# Patient Record
Sex: FEMALE | Race: WHITE
[De-identification: names, ages, dates, MRNs, and addresses within clinical notes are randomized per-mention and may not be internally consistent; named-entity substitution may affect disease eponyms.]

---

## 2020-12-08 ENCOUNTER — HOSPITAL ENCOUNTER (OUTPATIENT)
Dept: HOSPITAL 53 - M LAB REF | Age: 70
End: 2020-12-08
Attending: INTERNAL MEDICINE
Payer: MEDICARE

## 2020-12-08 DIAGNOSIS — D64.9: Primary | ICD-10-CM

## 2020-12-08 LAB
FERRITIN SERPL-MCNC: 6 NG/ML (ref 8–252)
FOLATE SERPL-MCNC: > 24 NG/ML
IRON SATN MFR SERPL: 6 % (ref 13.2–45)
IRON SERPL-MCNC: 29 UG/DL (ref 50–170)
TIBC SERPL-MCNC: 482 UG/DL (ref 250–450)
VIT B12 SERPL-MCNC: 562 PG/ML

## 2020-12-18 ENCOUNTER — HOSPITAL ENCOUNTER (OUTPATIENT)
Dept: HOSPITAL 53 - M INFU | Age: 70
Discharge: HOME | End: 2020-12-18
Attending: INTERNAL MEDICINE
Payer: MEDICARE

## 2020-12-18 ENCOUNTER — HOSPITAL ENCOUNTER (OUTPATIENT)
Dept: HOSPITAL 53 - M RAD | Age: 70
End: 2020-12-18
Attending: INTERNAL MEDICINE
Payer: MEDICARE

## 2020-12-18 VITALS — HEIGHT: 66 IN | BODY MASS INDEX: 24.43 KG/M2 | WEIGHT: 152 LBS

## 2020-12-18 VITALS — SYSTOLIC BLOOD PRESSURE: 88 MMHG | DIASTOLIC BLOOD PRESSURE: 68 MMHG

## 2020-12-18 VITALS — DIASTOLIC BLOOD PRESSURE: 68 MMHG | SYSTOLIC BLOOD PRESSURE: 95 MMHG

## 2020-12-18 DIAGNOSIS — N18.32: Primary | ICD-10-CM

## 2020-12-18 DIAGNOSIS — Z88.2: ICD-10-CM

## 2020-12-18 DIAGNOSIS — R32: ICD-10-CM

## 2020-12-18 DIAGNOSIS — Z88.6: ICD-10-CM

## 2020-12-18 DIAGNOSIS — I12.9: ICD-10-CM

## 2020-12-18 DIAGNOSIS — N18.32: ICD-10-CM

## 2020-12-18 DIAGNOSIS — D50.9: Primary | ICD-10-CM

## 2020-12-18 DIAGNOSIS — Z88.0: ICD-10-CM

## 2020-12-18 DIAGNOSIS — Z88.1: ICD-10-CM

## 2020-12-18 PROCEDURE — 76857 US EXAM PELVIC LIMITED: CPT

## 2020-12-18 PROCEDURE — 76775 US EXAM ABDO BACK WALL LIM: CPT

## 2020-12-18 PROCEDURE — 96365 THER/PROPH/DIAG IV INF INIT: CPT

## 2020-12-18 NOTE — REP
INDICATION:

CKD STAGE 3B, BLADDER US WITH PVR.



COMPARISON:

None.



TECHNIQUE:

Bilateral renal ultrasound.



FINDINGS:

The right kidney measures 8.9 x 3.8 x 9.3 cm.

The left kidney measures 9.3 x 4.2 x 4.1 cm.

Both kidneys are in the  low normal size range.

Renal cortical echogenicity is normal bilaterally.

There is no calculus or hydronephrosis on the right or the left.

There are no solid or cystic renal masses on the right or the left.



IMPRESSION:

The kidneys are than low normal size range.

Otherwise, negative renal ultrasound.





<Electronically signed by Jose An > 12/18/20 [id number]

## 2020-12-18 NOTE — REP
INDICATION:

CKD STAGE 3B, BLADDER US WITH PVR.



COMPARISON:

None.



TECHNIQUE:

Bladder ultrasound.



FINDINGS:

The prevoid bladder volume is 328 mL.

The postvoid bladder volume is 30 mL.

The postvoid residual is 9%.



There is a nodule along the bladder wall inferiorly measuring 1-1.2 x 1.7 cm.  This is

nonspecific and could represent a neoplasm, polyp, focal inflammation or scarring.



IMPRESSION:

There is 9% bladder residual on the postvoid film.

There is a nonspecific nodule along the inferior bladder wall as described above.





<Electronically signed by Jose An > 12/18/20 [id number]

## 2020-12-30 ENCOUNTER — HOSPITAL ENCOUNTER (OUTPATIENT)
Dept: HOSPITAL 53 - M INFU | Age: 70
Discharge: HOME | End: 2020-12-30
Attending: INTERNAL MEDICINE
Payer: MEDICARE

## 2020-12-30 VITALS — SYSTOLIC BLOOD PRESSURE: 105 MMHG | DIASTOLIC BLOOD PRESSURE: 52 MMHG

## 2020-12-30 VITALS — SYSTOLIC BLOOD PRESSURE: 92 MMHG | DIASTOLIC BLOOD PRESSURE: 52 MMHG

## 2020-12-30 VITALS — WEIGHT: 149.91 LBS | BODY MASS INDEX: 31.47 KG/M2 | HEIGHT: 58 IN

## 2020-12-30 DIAGNOSIS — D50.9: Primary | ICD-10-CM

## 2020-12-30 DIAGNOSIS — Z79.899: ICD-10-CM

## 2020-12-30 PROCEDURE — 96365 THER/PROPH/DIAG IV INF INIT: CPT

## 2021-01-21 ENCOUNTER — HOSPITAL ENCOUNTER (OUTPATIENT)
Dept: HOSPITAL 53 - M SMT | Age: 71
End: 2021-01-21
Attending: NURSE PRACTITIONER
Payer: MEDICARE

## 2021-01-21 DIAGNOSIS — Z79.899: ICD-10-CM

## 2021-01-21 DIAGNOSIS — R93.41: Primary | ICD-10-CM

## 2021-01-21 LAB
APPEARANCE UR: CLEAR
BACTERIA UR QL AUTO: NEGATIVE
BILIRUB UR QL STRIP.AUTO: NEGATIVE
GLUCOSE UR QL STRIP.AUTO: NEGATIVE MG/DL
HGB UR QL STRIP.AUTO: NEGATIVE
KETONES UR QL STRIP.AUTO: NEGATIVE MG/DL
LEUKOCYTE ESTERASE UR QL STRIP.AUTO: (no result)
NITRITE UR QL STRIP.AUTO: NEGATIVE
PH UR STRIP.AUTO: 5 UNITS (ref 5–9)
PROT UR QL STRIP.AUTO: NEGATIVE MG/DL
RBC # UR AUTO: 0 /HPF (ref 0–3)
SP GR UR STRIP.AUTO: 1.01 (ref 1–1.03)
SQUAMOUS #/AREA URNS AUTO: 0 /HPF (ref 0–6)
UROBILINOGEN UR QL STRIP.AUTO: 0.2 MG/DL (ref 0–2)
WBC #/AREA URNS AUTO: 6 /HPF (ref 0–3)

## 2021-01-21 PROCEDURE — 81001 URINALYSIS AUTO W/SCOPE: CPT

## 2021-01-21 PROCEDURE — 87086 URINE CULTURE/COLONY COUNT: CPT

## 2021-03-03 ENCOUNTER — HOSPITAL ENCOUNTER (OUTPATIENT)
Dept: HOSPITAL 53 - M RAD | Age: 71
End: 2021-03-03
Attending: UROLOGY
Payer: MEDICARE

## 2021-03-03 DIAGNOSIS — N21.0: Primary | ICD-10-CM

## 2021-03-03 DIAGNOSIS — Z90.49: ICD-10-CM

## 2021-03-03 DIAGNOSIS — K57.30: ICD-10-CM

## 2021-03-03 DIAGNOSIS — K44.9: ICD-10-CM

## 2021-03-03 DIAGNOSIS — N32.9: ICD-10-CM

## 2021-03-03 PROCEDURE — 74178 CT ABD&PLV WO CNTR FLWD CNTR: CPT

## 2021-03-03 NOTE — REP
INDICATION:

LESION ON BLADDER.



COMPARISON:

None



TECHNIQUE:

Axial precontrast, contrast-enhanced and delayed images from the lung bases to the

pubic symphysis using 100 cc Isovue 370 intravenous contrast material.  Coronal and

sagittal reformations obtained along with CT urogram.



This CT examination was performed using the following dose reduction techniques:

Automated exposure control, adjustment of mA and/or kv according to the patient's

size, and the use of iterative reconstruction technique.



FINDINGS:

1 cm dependent calcification in the bladder consistent with bladder stone.  Delayed

images suggest very mild thickening at the right bladder trigone of uncertain clinical

significance.  The bilateral kidneys and ureters are normal in all phases of

evaluation.



Liver, spleen, pancreas, and bilateral adrenal glands are normal.  Evidence for prior

cholecystectomy with compensatory biliary ductal dilatation noted.



Moderate to large paraesophageal gastric hiatal hernia noted.  There is no evidence

for bowel obstruction or acute inflammatory process.  Sigmoid diverticulosis noted

without acute diverticulitis.



Further evaluation of the pelvis demonstrates prior hysterectomy.  Bladder as

described above.  Abdominal aorta and vasculature appear normal and without aneurysm

or dissection.  No ascites.  No free air.  No adenopathy.  Musculoskeletal structures

demonstrate epidural stimulator at the thoracic level along with evidence for prior

lumbar laminectomy.



IMPRESSION:

1. Bladder demonstrates 1 cm bladder stone and very subtle nonspecific prominence

along the right bladder trigone.  The bilateral kidneys/ureters are normal.

2. Moderate to large paraesophageal gastric hiatal hernia.

3. Diverticulosis without acute diverticulitis.

4. Evidence for prior cholecystectomy, hysterectomy, and laminectomy.







<Electronically signed by Rogers Cueva > 03/03/21 [id number]

## 2021-07-15 ENCOUNTER — HOSPITAL ENCOUNTER (OUTPATIENT)
Dept: HOSPITAL 53 - M SMT | Age: 71
End: 2021-07-15
Attending: NURSE PRACTITIONER
Payer: MEDICARE

## 2021-07-15 DIAGNOSIS — N21.0: Primary | ICD-10-CM

## 2021-07-15 LAB
APPEARANCE UR: CLEAR
BACTERIA UR QL AUTO: NEGATIVE
BILIRUB UR QL STRIP.AUTO: NEGATIVE
GLUCOSE UR QL STRIP.AUTO: NEGATIVE MG/DL
HGB UR QL STRIP.AUTO: NEGATIVE
KETONES UR QL STRIP.AUTO: NEGATIVE MG/DL
LEUKOCYTE ESTERASE UR QL STRIP.AUTO: (no result)
MUCOUS THREADS URNS QL MICRO: (no result)
NITRITE UR QL STRIP.AUTO: NEGATIVE
PH UR STRIP.AUTO: 6 UNITS (ref 5–9)
PROT UR QL STRIP.AUTO: NEGATIVE MG/DL
RBC # UR AUTO: 0 /HPF (ref 0–3)
SP GR UR STRIP.AUTO: 1.01 (ref 1–1.03)
SQUAMOUS #/AREA URNS AUTO: 1 /HPF (ref 0–6)
TRANS CELLS #/AREA URNS HPF: <1 /HPF
UROBILINOGEN UR QL STRIP.AUTO: 2 MG/DL (ref 0–2)
WBC #/AREA URNS AUTO: 20 /HPF (ref 0–3)

## 2021-07-15 PROCEDURE — 87086 URINE CULTURE/COLONY COUNT: CPT

## 2021-07-15 PROCEDURE — 81001 URINALYSIS AUTO W/SCOPE: CPT

## 2021-08-16 ENCOUNTER — HOSPITAL ENCOUNTER (OUTPATIENT)
Dept: HOSPITAL 53 - M LAB REF | Age: 71
End: 2021-08-16
Attending: INTERNAL MEDICINE
Payer: MEDICARE

## 2021-08-16 DIAGNOSIS — N21.0: Primary | ICD-10-CM

## 2021-08-19 ENCOUNTER — HOSPITAL ENCOUNTER (OUTPATIENT)
Dept: HOSPITAL 53 - M RAD | Age: 71
End: 2021-08-19
Payer: MEDICARE

## 2021-08-19 DIAGNOSIS — N20.0: Primary | ICD-10-CM

## 2021-08-19 NOTE — REP
INDICATION:

CALCULUS IN BLADDER.



COMPARISON:

CT 03/03/2021.



TECHNIQUE:

AP view abdomen and pelvis.



FINDINGS:

There is mild diffuse fecal material throughout the colon.  There is no bowel

obstruction, with no significantly dilated bowel loops identified.  There is a

heterogeneous calcific density overlying the bladder 1.2 cm in diameter.  Metallic

wires are visualized overlying the right side of the abdomen, the inferior aspect of

these wires overlies the right iliac bone.  The superior aspect crosses the right

upper quadrant to the midline, and the cephalic tip overlies the T10 vertebral body in

the midline.  There is evidence of prior laminectomy at L3 through L5.



IMPRESSION:

Heterogeneous 1.2 cm calcification overlies the bladder and may represent a calculus.





<Electronically signed by Jose Childers > 08/19/21 [id number]

## 2021-09-08 ENCOUNTER — HOSPITAL ENCOUNTER (OUTPATIENT)
Dept: HOSPITAL 53 - M INFU | Age: 71
Discharge: HOME | End: 2021-09-08
Attending: INTERNAL MEDICINE
Payer: MEDICARE

## 2021-09-08 VITALS — DIASTOLIC BLOOD PRESSURE: 53 MMHG | SYSTOLIC BLOOD PRESSURE: 98 MMHG

## 2021-09-08 VITALS — SYSTOLIC BLOOD PRESSURE: 96 MMHG | DIASTOLIC BLOOD PRESSURE: 51 MMHG

## 2021-09-08 VITALS — WEIGHT: 162.99 LBS | HEIGHT: 66 IN | BODY MASS INDEX: 26.19 KG/M2

## 2021-09-08 DIAGNOSIS — Z88.6: ICD-10-CM

## 2021-09-08 DIAGNOSIS — Z88.1: ICD-10-CM

## 2021-09-08 DIAGNOSIS — Z88.2: ICD-10-CM

## 2021-09-08 DIAGNOSIS — D50.9: Primary | ICD-10-CM

## 2021-09-08 DIAGNOSIS — Z88.8: ICD-10-CM

## 2021-09-08 PROCEDURE — 96365 THER/PROPH/DIAG IV INF INIT: CPT

## 2021-09-15 ENCOUNTER — HOSPITAL ENCOUNTER (OUTPATIENT)
Dept: HOSPITAL 53 - M INFU | Age: 71
Discharge: HOME | End: 2021-09-15
Attending: INTERNAL MEDICINE
Payer: MEDICARE

## 2021-09-15 VITALS — DIASTOLIC BLOOD PRESSURE: 66 MMHG | SYSTOLIC BLOOD PRESSURE: 110 MMHG

## 2021-09-15 VITALS — HEIGHT: 56 IN | BODY MASS INDEX: 36.65 KG/M2 | WEIGHT: 162.92 LBS

## 2021-09-15 VITALS — DIASTOLIC BLOOD PRESSURE: 53 MMHG | SYSTOLIC BLOOD PRESSURE: 101 MMHG

## 2021-09-15 VITALS — SYSTOLIC BLOOD PRESSURE: 124 MMHG | DIASTOLIC BLOOD PRESSURE: 60 MMHG

## 2021-09-15 VITALS — SYSTOLIC BLOOD PRESSURE: 102 MMHG | DIASTOLIC BLOOD PRESSURE: 52 MMHG

## 2021-09-15 DIAGNOSIS — Z88.1: ICD-10-CM

## 2021-09-15 DIAGNOSIS — D50.9: Primary | ICD-10-CM

## 2021-09-15 DIAGNOSIS — Z88.8: ICD-10-CM

## 2021-09-15 DIAGNOSIS — Z88.0: ICD-10-CM

## 2021-09-15 DIAGNOSIS — Z88.6: ICD-10-CM

## 2021-09-15 PROCEDURE — 96366 THER/PROPH/DIAG IV INF ADDON: CPT

## 2021-09-15 PROCEDURE — 96365 THER/PROPH/DIAG IV INF INIT: CPT

## 2022-03-17 ENCOUNTER — HOSPITAL ENCOUNTER (OUTPATIENT)
Dept: HOSPITAL 53 - M INFU | Age: 72
Discharge: HOME | End: 2022-03-17
Attending: INTERNAL MEDICINE
Payer: MEDICARE

## 2022-03-17 VITALS — HEIGHT: 56 IN | BODY MASS INDEX: 38.68 KG/M2 | WEIGHT: 171.96 LBS

## 2022-03-17 VITALS — DIASTOLIC BLOOD PRESSURE: 54 MMHG | SYSTOLIC BLOOD PRESSURE: 109 MMHG

## 2022-03-17 VITALS — DIASTOLIC BLOOD PRESSURE: 60 MMHG | SYSTOLIC BLOOD PRESSURE: 125 MMHG

## 2022-03-17 DIAGNOSIS — Z88.1: ICD-10-CM

## 2022-03-17 DIAGNOSIS — D50.9: Primary | ICD-10-CM

## 2022-03-17 DIAGNOSIS — Z88.2: ICD-10-CM

## 2022-03-17 DIAGNOSIS — Z88.8: ICD-10-CM

## 2022-03-17 PROCEDURE — 96365 THER/PROPH/DIAG IV INF INIT: CPT

## 2022-03-22 ENCOUNTER — HOSPITAL ENCOUNTER (OUTPATIENT)
Dept: HOSPITAL 53 - M RAD | Age: 72
End: 2022-03-22
Attending: INTERNAL MEDICINE
Payer: MEDICARE

## 2022-03-22 DIAGNOSIS — N18.32: Primary | ICD-10-CM

## 2022-03-22 DIAGNOSIS — N21.0: ICD-10-CM

## 2022-03-22 PROCEDURE — 74177 CT ABD & PELVIS W/CONTRAST: CPT

## 2022-03-24 ENCOUNTER — HOSPITAL ENCOUNTER (OUTPATIENT)
Dept: HOSPITAL 53 - M INFU | Age: 72
Discharge: HOME | End: 2022-03-24
Attending: INTERNAL MEDICINE
Payer: MEDICARE

## 2022-03-24 VITALS — SYSTOLIC BLOOD PRESSURE: 126 MMHG | DIASTOLIC BLOOD PRESSURE: 65 MMHG

## 2022-03-24 VITALS — SYSTOLIC BLOOD PRESSURE: 111 MMHG | DIASTOLIC BLOOD PRESSURE: 73 MMHG

## 2022-03-24 VITALS — SYSTOLIC BLOOD PRESSURE: 137 MMHG | DIASTOLIC BLOOD PRESSURE: 71 MMHG

## 2022-03-24 VITALS — HEIGHT: 56 IN | BODY MASS INDEX: 38.54 KG/M2 | WEIGHT: 171.34 LBS

## 2022-03-24 DIAGNOSIS — Z88.1: ICD-10-CM

## 2022-03-24 DIAGNOSIS — Z88.2: ICD-10-CM

## 2022-03-24 DIAGNOSIS — Z88.8: ICD-10-CM

## 2022-03-24 DIAGNOSIS — D50.9: Primary | ICD-10-CM

## 2022-03-24 PROCEDURE — 96365 THER/PROPH/DIAG IV INF INIT: CPT

## 2022-03-24 PROCEDURE — 96366 THER/PROPH/DIAG IV INF ADDON: CPT

## 2022-11-29 ENCOUNTER — HOSPITAL ENCOUNTER (OUTPATIENT)
Dept: HOSPITAL 53 - M RAD | Age: 72
End: 2022-11-29
Attending: PHYSICIAN ASSISTANT
Payer: MEDICARE

## 2022-11-29 DIAGNOSIS — L97.522: Primary | ICD-10-CM

## 2022-12-12 ENCOUNTER — HOSPITAL ENCOUNTER (INPATIENT)
Dept: HOSPITAL 53 - M ED | Age: 72
LOS: 3 days | Discharge: HOME | DRG: 812 | End: 2022-12-15
Attending: INTERNAL MEDICINE | Admitting: INTERNAL MEDICINE
Payer: MEDICARE

## 2022-12-12 VITALS — HEIGHT: 55 IN | BODY MASS INDEX: 37.14 KG/M2 | WEIGHT: 160.5 LBS

## 2022-12-12 DIAGNOSIS — F41.9: ICD-10-CM

## 2022-12-12 DIAGNOSIS — K57.30: ICD-10-CM

## 2022-12-12 DIAGNOSIS — Z88.8: ICD-10-CM

## 2022-12-12 DIAGNOSIS — D50.9: Primary | ICD-10-CM

## 2022-12-12 DIAGNOSIS — E03.9: ICD-10-CM

## 2022-12-12 DIAGNOSIS — K64.8: ICD-10-CM

## 2022-12-12 DIAGNOSIS — S91.302S: ICD-10-CM

## 2022-12-12 DIAGNOSIS — Z79.890: ICD-10-CM

## 2022-12-12 DIAGNOSIS — N18.30: ICD-10-CM

## 2022-12-12 DIAGNOSIS — Z79.899: ICD-10-CM

## 2022-12-12 DIAGNOSIS — Z88.0: ICD-10-CM

## 2022-12-12 DIAGNOSIS — Z79.82: ICD-10-CM

## 2022-12-12 DIAGNOSIS — D62: ICD-10-CM

## 2022-12-12 DIAGNOSIS — Z88.1: ICD-10-CM

## 2022-12-12 DIAGNOSIS — K29.70: ICD-10-CM

## 2022-12-12 DIAGNOSIS — K44.9: ICD-10-CM

## 2022-12-12 DIAGNOSIS — Z88.6: ICD-10-CM

## 2022-12-12 DIAGNOSIS — F32.A: ICD-10-CM

## 2022-12-12 DIAGNOSIS — J45.909: ICD-10-CM

## 2022-12-12 DIAGNOSIS — Z96.653: ICD-10-CM

## 2022-12-12 DIAGNOSIS — E78.5: ICD-10-CM

## 2022-12-12 DIAGNOSIS — L93.0: ICD-10-CM

## 2022-12-12 DIAGNOSIS — G60.8: ICD-10-CM

## 2022-12-12 DIAGNOSIS — I12.9: ICD-10-CM

## 2022-12-12 DIAGNOSIS — Z88.2: ICD-10-CM

## 2022-12-12 LAB
BASOPHILS # BLD AUTO: 0 10^3/UL (ref 0–0.2)
BASOPHILS NFR BLD AUTO: 0.5 % (ref 0–1)
EOSINOPHIL # BLD AUTO: 0.1 10^3/UL (ref 0–0.5)
EOSINOPHIL NFR BLD AUTO: 1.3 % (ref 0–3)
HCT VFR BLD AUTO: 19.6 % (ref 36–47)
HGB BLD-MCNC: 4.9 G/DL (ref 12–15.5)
LYMPHOCYTES # BLD AUTO: 1.7 10^3/UL (ref 1.5–5)
LYMPHOCYTES NFR BLD AUTO: 26.4 % (ref 24–44)
MCH RBC QN AUTO: 17 PG (ref 27–33)
MCHC RBC AUTO-ENTMCNC: 25 G/DL (ref 32–36.5)
MCV RBC AUTO: 68.1 FL (ref 80–96)
MONOCYTES # BLD AUTO: 0.5 10^3/UL (ref 0–0.8)
MONOCYTES NFR BLD AUTO: 8.3 % (ref 2–8)
NEUTROPHILS # BLD AUTO: 3.9 10^3/UL (ref 1.5–8.5)
NEUTROPHILS NFR BLD AUTO: 62.7 % (ref 36–66)
PLATELET # BLD AUTO: 233 10^3/UL (ref 150–450)
RBC # BLD AUTO: 2.88 10^6/UL (ref 4–5.4)
WBC # BLD AUTO: 6.3 10^3/UL (ref 4–10)

## 2022-12-13 VITALS — DIASTOLIC BLOOD PRESSURE: 66 MMHG | SYSTOLIC BLOOD PRESSURE: 142 MMHG

## 2022-12-13 VITALS — DIASTOLIC BLOOD PRESSURE: 60 MMHG | SYSTOLIC BLOOD PRESSURE: 127 MMHG

## 2022-12-13 VITALS — SYSTOLIC BLOOD PRESSURE: 122 MMHG | DIASTOLIC BLOOD PRESSURE: 60 MMHG

## 2022-12-13 VITALS — SYSTOLIC BLOOD PRESSURE: 117 MMHG | DIASTOLIC BLOOD PRESSURE: 58 MMHG

## 2022-12-13 VITALS — SYSTOLIC BLOOD PRESSURE: 140 MMHG | DIASTOLIC BLOOD PRESSURE: 68 MMHG

## 2022-12-13 VITALS — SYSTOLIC BLOOD PRESSURE: 113 MMHG | DIASTOLIC BLOOD PRESSURE: 56 MMHG

## 2022-12-13 VITALS — DIASTOLIC BLOOD PRESSURE: 60 MMHG | SYSTOLIC BLOOD PRESSURE: 125 MMHG

## 2022-12-13 VITALS — SYSTOLIC BLOOD PRESSURE: 105 MMHG | DIASTOLIC BLOOD PRESSURE: 55 MMHG

## 2022-12-13 VITALS — DIASTOLIC BLOOD PRESSURE: 57 MMHG | SYSTOLIC BLOOD PRESSURE: 126 MMHG

## 2022-12-13 LAB
APTT BLD: 25.2 SECONDS (ref 24.8–34.2)
BUN SERPL-MCNC: 15 MG/DL (ref 9–23)
BUN SERPL-MCNC: 16 MG/DL (ref 9–23)
CALCIUM SERPL-MCNC: 8.4 MG/DL (ref 8.3–10.6)
CALCIUM SERPL-MCNC: 8.5 MG/DL (ref 8.3–10.6)
CHLORIDE SERPL-SCNC: 105 MMOL/L (ref 98–107)
CHLORIDE SERPL-SCNC: 109 MMOL/L (ref 98–107)
CK MB CFR.DF SERPL CALC: 1.66
CK MB CFR.DF SERPL CALC: 2.17
CK MB SERPL-MCNC: < 1 NG/ML (ref ?–3.6)
CK MB SERPL-MCNC: < 1 NG/ML (ref ?–3.6)
CK SERPL-CCNC: 46 U/L (ref 34–145)
CK SERPL-CCNC: 60 U/L (ref 34–145)
CO2 SERPL-SCNC: 27 MMOL/L (ref 20–31)
CO2 SERPL-SCNC: 29 MMOL/L (ref 20–31)
CREAT SERPL-MCNC: 0.99 MG/DL (ref 0.55–1.3)
CREAT SERPL-MCNC: 1 MG/DL (ref 0.55–1.3)
FERRITIN SERPL-MCNC: 2.9 NG/ML (ref 7.3–270.7)
GFR SERPL CREATININE-BSD FRML MDRD: 58.4 ML/MIN/{1.73_M2} (ref 39–?)
GFR SERPL CREATININE-BSD FRML MDRD: 59 ML/MIN/{1.73_M2} (ref 39–?)
GLUCOSE SERPL-MCNC: 109 MG/DL (ref 74–106)
GLUCOSE SERPL-MCNC: 77 MG/DL (ref 74–106)
HCT VFR BLD AUTO: 34 % (ref 36–47)
HGB BLD-MCNC: 10.2 G/DL (ref 12–15.5)
INR PPP: 0.97
IRON SATN MFR SERPL: 2 % (ref 13.2–45)
IRON SERPL-MCNC: 9 UG/DL (ref 50–170)
POTASSIUM SERPL-SCNC: 3.8 MMOL/L (ref 3.5–5.1)
POTASSIUM SERPL-SCNC: 4.5 MMOL/L (ref 3.5–5.1)
PROTHROMBIN TIME: 13.1 SECONDS (ref 12.5–14.5)
SODIUM SERPL-SCNC: 142 MMOL/L (ref 136–145)
SODIUM SERPL-SCNC: 142 MMOL/L (ref 136–145)
TIBC SERPL-MCNC: 449 UG/DL (ref 250–425)

## 2022-12-13 PROCEDURE — 30233N1 TRANSFUSION OF NONAUTOLOGOUS RED BLOOD CELLS INTO PERIPHERAL VEIN, PERCUTANEOUS APPROACH: ICD-10-PCS | Performed by: EMERGENCY MEDICINE

## 2022-12-13 RX ADMIN — PREGABALIN SCH MG: 75 CAPSULE ORAL at 16:56

## 2022-12-13 RX ADMIN — FERROUS SULFATE TAB 325 MG (65 MG ELEMENTAL FE) SCH MG: 325 (65 FE) TAB at 20:38

## 2022-12-13 RX ADMIN — DOCUSATE SODIUM SCH MG: 100 CAPSULE, LIQUID FILLED ORAL at 09:01

## 2022-12-13 RX ADMIN — PREGABALIN SCH MG: 75 CAPSULE ORAL at 13:23

## 2022-12-13 RX ADMIN — FEXOFENADINE HYDROCHLORIDE SCH MG: 60 TABLET ORAL at 20:37

## 2022-12-13 RX ADMIN — TORSEMIDE SCH MG: 10 TABLET ORAL at 08:07

## 2022-12-13 RX ADMIN — METHADONE HYDROCHLORIDE SCH MG: 10 TABLET ORAL at 03:58

## 2022-12-13 RX ADMIN — ESCITALOPRAM OXALATE SCH MG: 10 TABLET, FILM COATED ORAL at 09:02

## 2022-12-13 RX ADMIN — LEVOTHYROXINE SODIUM SCH MCG: 75 TABLET ORAL at 06:02

## 2022-12-13 RX ADMIN — DOCUSATE SODIUM SCH MG: 100 CAPSULE, LIQUID FILLED ORAL at 20:37

## 2022-12-13 RX ADMIN — SIMVASTATIN SCH MG: 20 TABLET, FILM COATED ORAL at 20:38

## 2022-12-13 RX ADMIN — FERROUS SULFATE TAB 325 MG (65 MG ELEMENTAL FE) SCH MG: 325 (65 FE) TAB at 09:02

## 2022-12-13 RX ADMIN — METHADONE HYDROCHLORIDE SCH MG: 10 TABLET ORAL at 20:38

## 2022-12-13 RX ADMIN — PREGABALIN SCH MG: 75 CAPSULE ORAL at 20:38

## 2022-12-13 RX ADMIN — BUPROPION HYDROCHLORIDE SCH MG: 150 TABLET, FILM COATED, EXTENDED RELEASE ORAL at 09:01

## 2022-12-13 RX ADMIN — PREGABALIN SCH MG: 75 CAPSULE ORAL at 06:02

## 2022-12-14 VITALS — DIASTOLIC BLOOD PRESSURE: 64 MMHG | SYSTOLIC BLOOD PRESSURE: 124 MMHG

## 2022-12-14 VITALS — DIASTOLIC BLOOD PRESSURE: 55 MMHG | SYSTOLIC BLOOD PRESSURE: 115 MMHG

## 2022-12-14 VITALS — SYSTOLIC BLOOD PRESSURE: 123 MMHG | DIASTOLIC BLOOD PRESSURE: 60 MMHG

## 2022-12-14 VITALS — DIASTOLIC BLOOD PRESSURE: 58 MMHG | SYSTOLIC BLOOD PRESSURE: 120 MMHG

## 2022-12-14 LAB
ALBUMIN SERPL BCG-MCNC: 2.9 G/DL (ref 3.2–5.2)
ALP SERPL-CCNC: 104 U/L (ref 46–116)
ALT SERPL W P-5'-P-CCNC: 13 U/L (ref 7–40)
AST SERPL-CCNC: 18 U/L (ref ?–34)
BILIRUB SERPL-MCNC: 0.7 MG/DL (ref 0.3–1.2)
BUN SERPL-MCNC: 14 MG/DL (ref 9–23)
CALCIUM SERPL-MCNC: 8.5 MG/DL (ref 8.3–10.6)
CHLORIDE SERPL-SCNC: 107 MMOL/L (ref 98–107)
CO2 SERPL-SCNC: 28 MMOL/L (ref 20–31)
CREAT SERPL-MCNC: 1.01 MG/DL (ref 0.55–1.3)
GFR SERPL CREATININE-BSD FRML MDRD: 57.7 ML/MIN/{1.73_M2} (ref 39–?)
GLUCOSE SERPL-MCNC: 66 MG/DL (ref 74–106)
HCT VFR BLD AUTO: 33.7 % (ref 36–47)
HGB BLD-MCNC: 10.1 G/DL (ref 12–15.5)
MCH RBC QN AUTO: 23.2 PG (ref 27–33)
MCHC RBC AUTO-ENTMCNC: 30 G/DL (ref 32–36.5)
MCV RBC AUTO: 77.5 FL (ref 80–96)
PLATELET # BLD AUTO: 189 10^3/UL (ref 150–450)
POTASSIUM SERPL-SCNC: 3.7 MMOL/L (ref 3.5–5.1)
PROT SERPL-MCNC: 5.7 G/DL (ref 5.7–8.2)
RBC # BLD AUTO: 4.35 10^6/UL (ref 4–5.4)
SODIUM SERPL-SCNC: 143 MMOL/L (ref 136–145)
WBC # BLD AUTO: 6.4 10^3/UL (ref 4–10)

## 2022-12-14 RX ADMIN — FERROUS SULFATE TAB 325 MG (65 MG ELEMENTAL FE) SCH MG: 325 (65 FE) TAB at 21:00

## 2022-12-14 RX ADMIN — LEVOTHYROXINE SODIUM SCH MCG: 75 TABLET ORAL at 06:17

## 2022-12-14 RX ADMIN — PREGABALIN SCH MG: 75 CAPSULE ORAL at 20:30

## 2022-12-14 RX ADMIN — FERROUS SULFATE TAB 325 MG (65 MG ELEMENTAL FE) SCH MG: 325 (65 FE) TAB at 20:31

## 2022-12-14 RX ADMIN — DOCUSATE SODIUM SCH MG: 100 CAPSULE, LIQUID FILLED ORAL at 08:34

## 2022-12-14 RX ADMIN — ESCITALOPRAM OXALATE SCH MG: 10 TABLET, FILM COATED ORAL at 08:34

## 2022-12-14 RX ADMIN — PREGABALIN SCH MG: 75 CAPSULE ORAL at 08:34

## 2022-12-14 RX ADMIN — FEXOFENADINE HYDROCHLORIDE SCH MG: 60 TABLET ORAL at 20:30

## 2022-12-14 RX ADMIN — SODIUM CHLORIDE SCH MLS/HR: 9 INJECTION, SOLUTION INTRAVENOUS at 10:44

## 2022-12-14 RX ADMIN — BUPROPION HYDROCHLORIDE SCH MG: 150 TABLET, FILM COATED, EXTENDED RELEASE ORAL at 08:34

## 2022-12-14 RX ADMIN — TORSEMIDE SCH MG: 10 TABLET ORAL at 08:34

## 2022-12-14 RX ADMIN — PREGABALIN SCH MG: 75 CAPSULE ORAL at 13:45

## 2022-12-14 RX ADMIN — PREGABALIN SCH MG: 75 CAPSULE ORAL at 17:15

## 2022-12-14 RX ADMIN — SIMVASTATIN SCH MG: 20 TABLET, FILM COATED ORAL at 20:31

## 2022-12-14 RX ADMIN — FERROUS SULFATE TAB 325 MG (65 MG ELEMENTAL FE) SCH MG: 325 (65 FE) TAB at 08:34

## 2022-12-14 RX ADMIN — METHADONE HYDROCHLORIDE SCH MG: 10 TABLET ORAL at 20:31

## 2022-12-14 RX ADMIN — DOCUSATE SODIUM SCH MG: 100 CAPSULE, LIQUID FILLED ORAL at 20:31

## 2022-12-15 VITALS — DIASTOLIC BLOOD PRESSURE: 58 MMHG | SYSTOLIC BLOOD PRESSURE: 110 MMHG

## 2022-12-15 VITALS — DIASTOLIC BLOOD PRESSURE: 67 MMHG | SYSTOLIC BLOOD PRESSURE: 146 MMHG

## 2022-12-15 VITALS — OXYGEN SATURATION: 97 %

## 2022-12-15 LAB
ALBUMIN SERPL BCG-MCNC: 3.5 G/DL (ref 3.2–5.2)
ALP SERPL-CCNC: 120 U/L (ref 46–116)
ALT SERPL W P-5'-P-CCNC: 24 U/L (ref 7–40)
AST SERPL-CCNC: 61 U/L (ref ?–34)
BILIRUB SERPL-MCNC: 0.8 MG/DL (ref 0.3–1.2)
BUN SERPL-MCNC: 11 MG/DL (ref 9–23)
CALCIUM SERPL-MCNC: 9.2 MG/DL (ref 8.3–10.6)
CHLORIDE SERPL-SCNC: 103 MMOL/L (ref 98–107)
CO2 SERPL-SCNC: 28 MMOL/L (ref 20–31)
CREAT SERPL-MCNC: 1.07 MG/DL (ref 0.55–1.3)
GFR SERPL CREATININE-BSD FRML MDRD: 54 ML/MIN/{1.73_M2} (ref 39–?)
GLUCOSE SERPL-MCNC: 61 MG/DL (ref 74–106)
HCT VFR BLD AUTO: 36.9 % (ref 36–47)
HGB BLD-MCNC: 10.8 G/DL (ref 12–15.5)
MCH RBC QN AUTO: 23.1 PG (ref 27–33)
MCHC RBC AUTO-ENTMCNC: 29.3 G/DL (ref 32–36.5)
MCV RBC AUTO: 79 FL (ref 80–96)
PLATELET # BLD AUTO: 199 10^3/UL (ref 150–450)
POTASSIUM SERPL-SCNC: 3.2 MMOL/L (ref 3.5–5.1)
PROT SERPL-MCNC: 6.6 G/DL (ref 5.7–8.2)
RBC # BLD AUTO: 4.67 10^6/UL (ref 4–5.4)
SODIUM SERPL-SCNC: 143 MMOL/L (ref 136–145)
WBC # BLD AUTO: 7.1 10^3/UL (ref 4–10)

## 2022-12-15 PROCEDURE — 0DB78ZX EXCISION OF STOMACH, PYLORUS, VIA NATURAL OR ARTIFICIAL OPENING ENDOSCOPIC, DIAGNOSTIC: ICD-10-PCS | Performed by: INTERNAL MEDICINE

## 2022-12-15 PROCEDURE — 0DJD8ZZ INSPECTION OF LOWER INTESTINAL TRACT, VIA NATURAL OR ARTIFICIAL OPENING ENDOSCOPIC: ICD-10-PCS | Performed by: INTERNAL MEDICINE

## 2022-12-15 RX ADMIN — LEVOTHYROXINE SODIUM SCH MCG: 75 TABLET ORAL at 06:28

## 2022-12-15 RX ADMIN — DOCUSATE SODIUM SCH MG: 100 CAPSULE, LIQUID FILLED ORAL at 09:00

## 2022-12-15 RX ADMIN — ESCITALOPRAM OXALATE SCH MG: 10 TABLET, FILM COATED ORAL at 09:56

## 2022-12-15 RX ADMIN — SODIUM CHLORIDE SCH MLS/HR: 9 INJECTION, SOLUTION INTRAVENOUS at 10:00

## 2022-12-15 RX ADMIN — PREGABALIN SCH MG: 75 CAPSULE ORAL at 14:07

## 2022-12-15 RX ADMIN — PREGABALIN SCH MG: 75 CAPSULE ORAL at 09:56

## 2022-12-15 RX ADMIN — FERROUS SULFATE TAB 325 MG (65 MG ELEMENTAL FE) SCH MG: 325 (65 FE) TAB at 09:00

## 2022-12-15 RX ADMIN — BUPROPION HYDROCHLORIDE SCH MG: 150 TABLET, FILM COATED, EXTENDED RELEASE ORAL at 09:56

## 2023-07-12 ENCOUNTER — HOSPITAL ENCOUNTER (OUTPATIENT)
Dept: HOSPITAL 53 - M LAB REF | Age: 73
End: 2023-07-12
Attending: INTERNAL MEDICINE
Payer: MEDICARE

## 2023-07-12 DIAGNOSIS — D50.9: Primary | ICD-10-CM

## 2023-07-12 LAB
FERRITIN SERPL-MCNC: 9.5 NG/ML (ref 7.3–270.7)
IRON SATN MFR SERPL: 7.9 % (ref 13.2–45)
IRON SERPL-MCNC: 33 UG/DL (ref 50–170)
TIBC SERPL-MCNC: 418 UG/DL (ref 250–425)

## 2023-09-18 ENCOUNTER — HOSPITAL ENCOUNTER (OUTPATIENT)
Dept: HOSPITAL 53 - M INFU | Age: 73
End: 2023-09-18
Attending: INTERNAL MEDICINE
Payer: MEDICARE

## 2023-09-18 VITALS — BODY MASS INDEX: 33.2 KG/M2 | WEIGHT: 145.51 LBS | HEIGHT: 55.5 IN

## 2023-09-18 VITALS — SYSTOLIC BLOOD PRESSURE: 120 MMHG | OXYGEN SATURATION: 98 % | DIASTOLIC BLOOD PRESSURE: 74 MMHG

## 2023-09-18 VITALS — DIASTOLIC BLOOD PRESSURE: 72 MMHG | OXYGEN SATURATION: 99 % | SYSTOLIC BLOOD PRESSURE: 129 MMHG

## 2023-09-18 DIAGNOSIS — E61.1: Primary | ICD-10-CM

## 2023-09-18 DIAGNOSIS — Z88.8: ICD-10-CM

## 2023-09-18 DIAGNOSIS — Z88.5: ICD-10-CM

## 2023-09-18 DIAGNOSIS — Z88.1: ICD-10-CM

## 2023-09-18 DIAGNOSIS — Z88.0: ICD-10-CM

## 2023-09-18 DIAGNOSIS — Z88.2: ICD-10-CM

## 2023-09-18 PROCEDURE — 96365 THER/PROPH/DIAG IV INF INIT: CPT

## 2025-02-10 ENCOUNTER — HOSPITAL ENCOUNTER (OUTPATIENT)
Dept: HOSPITAL 53 - M LAB REF | Age: 75
End: 2025-02-10
Attending: INTERNAL MEDICINE
Payer: MEDICARE

## 2025-02-10 DIAGNOSIS — D50.9: Primary | ICD-10-CM

## 2025-02-10 LAB
FERRITIN SERPL-MCNC: 5.1 NG/ML (ref 7.3–270.7)
IRON SATN MFR SERPL: 3.2 % (ref 13.2–45)
IRON SERPL-MCNC: 14 UG/DL (ref 50–170)
TIBC SERPL-MCNC: 443 UG/DL (ref 250–425)

## 2025-03-10 ENCOUNTER — HOSPITAL ENCOUNTER (OUTPATIENT)
Dept: HOSPITAL 53 - M INFU | Age: 75
End: 2025-03-10
Attending: INTERNAL MEDICINE
Payer: MEDICARE

## 2025-03-10 VITALS — SYSTOLIC BLOOD PRESSURE: 118 MMHG | DIASTOLIC BLOOD PRESSURE: 66 MMHG | OXYGEN SATURATION: 98 %

## 2025-03-10 VITALS — HEIGHT: 55 IN | BODY MASS INDEX: 31.33 KG/M2 | WEIGHT: 135.36 LBS

## 2025-03-10 VITALS — DIASTOLIC BLOOD PRESSURE: 78 MMHG | OXYGEN SATURATION: 98 % | SYSTOLIC BLOOD PRESSURE: 128 MMHG

## 2025-03-10 DIAGNOSIS — Z88.6: ICD-10-CM

## 2025-03-10 DIAGNOSIS — E61.1: Primary | ICD-10-CM

## 2025-03-10 DIAGNOSIS — Z88.2: ICD-10-CM

## 2025-03-10 DIAGNOSIS — Z88.8: ICD-10-CM

## 2025-03-10 DIAGNOSIS — Z88.0: ICD-10-CM

## 2025-03-10 PROCEDURE — 96366 THER/PROPH/DIAG IV INF ADDON: CPT

## 2025-03-10 PROCEDURE — 96365 THER/PROPH/DIAG IV INF INIT: CPT

## 2025-03-10 RX ADMIN — SODIUM CHLORIDE ONE MLS/HR: 9 INJECTION, SOLUTION INTRAVENOUS at 12:19

## 2025-03-17 ENCOUNTER — HOSPITAL ENCOUNTER (OUTPATIENT)
Dept: HOSPITAL 53 - M INFU | Age: 75
End: 2025-03-17
Attending: INTERNAL MEDICINE
Payer: MEDICARE

## 2025-03-17 VITALS — OXYGEN SATURATION: 100 % | SYSTOLIC BLOOD PRESSURE: 117 MMHG | DIASTOLIC BLOOD PRESSURE: 68 MMHG

## 2025-03-17 VITALS — WEIGHT: 135.36 LBS | BODY MASS INDEX: 31.33 KG/M2 | HEIGHT: 55 IN

## 2025-03-17 VITALS — OXYGEN SATURATION: 96 % | DIASTOLIC BLOOD PRESSURE: 59 MMHG | SYSTOLIC BLOOD PRESSURE: 119 MMHG

## 2025-03-17 DIAGNOSIS — Z88.6: ICD-10-CM

## 2025-03-17 DIAGNOSIS — Z88.0: ICD-10-CM

## 2025-03-17 DIAGNOSIS — E61.1: Primary | ICD-10-CM

## 2025-03-17 DIAGNOSIS — Z88.2: ICD-10-CM

## 2025-03-17 DIAGNOSIS — Z88.1: ICD-10-CM

## 2025-03-17 PROCEDURE — 96366 THER/PROPH/DIAG IV INF ADDON: CPT

## 2025-03-17 PROCEDURE — 96365 THER/PROPH/DIAG IV INF INIT: CPT

## 2025-03-17 RX ADMIN — SODIUM CHLORIDE ONE MLS/HR: 9 INJECTION, SOLUTION INTRAVENOUS at 15:27

## 2025-03-31 ENCOUNTER — HOSPITAL ENCOUNTER (OUTPATIENT)
Dept: HOSPITAL 53 - M INFU | Age: 75
End: 2025-03-31
Attending: INTERNAL MEDICINE
Payer: MEDICARE

## 2025-03-31 VITALS — OXYGEN SATURATION: 96 % | SYSTOLIC BLOOD PRESSURE: 97 MMHG | DIASTOLIC BLOOD PRESSURE: 55 MMHG

## 2025-03-31 DIAGNOSIS — Z88.2: ICD-10-CM

## 2025-03-31 DIAGNOSIS — Z88.6: ICD-10-CM

## 2025-03-31 DIAGNOSIS — Z88.0: ICD-10-CM

## 2025-03-31 DIAGNOSIS — Z88.8: ICD-10-CM

## 2025-03-31 DIAGNOSIS — E61.1: Primary | ICD-10-CM

## 2025-03-31 PROCEDURE — 96365 THER/PROPH/DIAG IV INF INIT: CPT

## 2025-03-31 PROCEDURE — 96366 THER/PROPH/DIAG IV INF ADDON: CPT

## 2025-03-31 RX ADMIN — SODIUM CHLORIDE ONE MLS/HR: 9 INJECTION, SOLUTION INTRAVENOUS at 13:22
